# Patient Record
Sex: MALE | Race: WHITE
[De-identification: names, ages, dates, MRNs, and addresses within clinical notes are randomized per-mention and may not be internally consistent; named-entity substitution may affect disease eponyms.]

---

## 2020-03-27 ENCOUNTER — HOSPITAL ENCOUNTER (EMERGENCY)
Dept: HOSPITAL 9 - MADERS | Age: 25
Discharge: HOME | End: 2020-03-27
Payer: COMMERCIAL
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
## 2020-03-27 DIAGNOSIS — S61.312A: ICD-10-CM

## 2020-03-27 DIAGNOSIS — S62.632B: Primary | ICD-10-CM

## 2020-03-27 PROCEDURE — 26755 TREAT FINGER FRACTURE EACH: CPT

## 2020-03-27 NOTE — RAD
Exam:

XR Finger(s) Rt Min 2 View



HISTORY:

Injury to right middle finger



COMPARISON:

None



FINDINGS:

There is a transverse fracture involving the proximal aspect distal phalanx of the right middle finge
r. The distal fracture fragment is displaced dorsally by approximately three quarters shaft width

with apex dorsal angulation of fracture fragments. This fracture is just beneath the base of the nail
bed suggesting an open fracture. There is mild separation of fracture fragments.

No additional fracture or dislocation is appreciated.



IMPRESSION:

Mildly  and angulated as well as displaced fracture involving the distal phalanx right middl
e finger. Fracture is just beneath the base of the nailbed and is worrisome for an open fracture.



Reported By: Ang Dodge 

Electronically Signed:  3/27/2020 3:00 PM